# Patient Record
Sex: FEMALE | Race: WHITE | NOT HISPANIC OR LATINO | Employment: UNEMPLOYED | URBAN - METROPOLITAN AREA
[De-identification: names, ages, dates, MRNs, and addresses within clinical notes are randomized per-mention and may not be internally consistent; named-entity substitution may affect disease eponyms.]

---

## 2019-03-06 ENCOUNTER — OFFICE VISIT (OUTPATIENT)
Dept: FAMILY MEDICINE CLINIC | Facility: CLINIC | Age: 49
End: 2019-03-06
Payer: COMMERCIAL

## 2019-03-06 VITALS
SYSTOLIC BLOOD PRESSURE: 128 MMHG | WEIGHT: 175 LBS | TEMPERATURE: 97.9 F | HEIGHT: 65 IN | DIASTOLIC BLOOD PRESSURE: 72 MMHG | HEART RATE: 80 BPM | BODY MASS INDEX: 29.16 KG/M2 | RESPIRATION RATE: 16 BRPM | OXYGEN SATURATION: 98 %

## 2019-03-06 DIAGNOSIS — F41.0 PANIC ATTACK: ICD-10-CM

## 2019-03-06 DIAGNOSIS — F99 INSOMNIA DUE TO OTHER MENTAL DISORDER: ICD-10-CM

## 2019-03-06 DIAGNOSIS — F41.9 ANXIETY: ICD-10-CM

## 2019-03-06 DIAGNOSIS — F51.05 INSOMNIA DUE TO OTHER MENTAL DISORDER: ICD-10-CM

## 2019-03-06 DIAGNOSIS — I47.1 SUPRAVENTRICULAR TACHYCARDIA (HCC): Primary | ICD-10-CM

## 2019-03-06 PROCEDURE — 1036F TOBACCO NON-USER: CPT | Performed by: NURSE PRACTITIONER

## 2019-03-06 PROCEDURE — 99203 OFFICE O/P NEW LOW 30 MIN: CPT | Performed by: NURSE PRACTITIONER

## 2019-03-06 PROCEDURE — 3008F BODY MASS INDEX DOCD: CPT | Performed by: NURSE PRACTITIONER

## 2019-03-06 RX ORDER — ALPRAZOLAM 0.5 MG/1
0.5 TABLET ORAL 2 TIMES DAILY PRN
Qty: 60 TABLET | Refills: 0 | Status: SHIPPED | OUTPATIENT
Start: 2019-03-06

## 2019-03-06 RX ORDER — METOPROLOL SUCCINATE 25 MG/1
25 TABLET, EXTENDED RELEASE ORAL DAILY
COMMUNITY
Start: 2019-03-04

## 2019-03-06 NOTE — ASSESSMENT & PLAN NOTE
Pt currently wearing halter monitor  Following up with cardiology in 5-6 days, appt scheduled with Dr Keeley Borden in [address] [place] [address] pt stay well hydrated and discussed SVT, signs and symptoms to watch for and when to go to the ER or follow up again in office  Verbalized understanding

## 2019-03-06 NOTE — ASSESSMENT & PLAN NOTE
PHQ 9 score of 3  Exacerbated by recent symptomatic periods of SVT  I feel this pt has moderate to severe anxiety as well as underlying grief which pt has never sought counseling for  I advised strongly that counseling is in her best interest in order tackle underlying triggers for anxiety  Recommend self care, omega 3 and multivitamin supplementation as well as increased hydration and improved nutrition  Per pt she has had a bad reaction to some form of antidepressant and we will await records from previous primary care for details  I recommended to the pt that she be seen in one month here at our office for recheck and possible start of medication and counseling services  Pt in agreement and would like to wait to see cardiology prior to starting any additional therapy
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
## 2019-03-06 NOTE — PATIENT INSTRUCTIONS
Supraventricular tachycardia  - drink plenty of fluids  - follow up with cardiology as scheduled    Start supplementation with omega 3 and B12  - Place Omega 3 in freezer to help with digestion  Drink plenty of fluids throughout the day (6-8 glasses of water daily)  - good nutrition, fruits, veggies, olive oil  Avoid refined sugars, everything in moderation  Start positive self talk strategies and positive self care strategies:  - journal   - exercise  - music  I recommend weekly counseling which we will discussed at your next visit    Take medication as directed  - you may split a pill in half at night when you first start out and take the other half 30-40 minutes later and see how you feel  - you may also try benadryl 25mg at night for sleep and anxiety symptoms but do not take alprazolam and benadryl together until you know how you react on both of these medications    Return to the office in one month for a recheck   Sooner if needed  Go to the ER for any chest pain, jaw pain or SOB during periods of rest that does not resolve in 10-15 minutes

## 2019-03-08 ENCOUNTER — TELEPHONE (OUTPATIENT)
Dept: FAMILY MEDICINE CLINIC | Facility: CLINIC | Age: 49
End: 2019-03-08

## 2019-03-08 NOTE — TELEPHONE ENCOUNTER
Advised that pt should go to the hospital immediately if she feels that she is in crisis  After discharge we can set her up with outpatient psychiatry  I do NOT believe her symptoms are GI related but have not totally ruled out physiologic cause of symptoms   Deon Hernandez

## 2019-03-08 NOTE — TELEPHONE ENCOUNTER
patient called states  Xanax is to strong for her because she felt dizzy patient continued to states she was having palpitations, and was thinking about having herself admitted into the hospital  Patient states she believes it is due to palpitations and has gone to [facility] and was told it is GI related and felt as if  her concern with anxiety was dismissed  Spoke with miri and advised if patient feels like she should admit herself to the hospital  She should go  Offered to call ambulance for  patient  declined state  She is going to wait for  to arrive from work and will discuss with him and call back with what patient has decided to do